# Patient Record
Sex: MALE | Race: WHITE | NOT HISPANIC OR LATINO | URBAN - METROPOLITAN AREA
[De-identification: names, ages, dates, MRNs, and addresses within clinical notes are randomized per-mention and may not be internally consistent; named-entity substitution may affect disease eponyms.]

---

## 2021-03-05 ENCOUNTER — TELEPHONE ENCOUNTER (OUTPATIENT)
Dept: URBAN - METROPOLITAN AREA CLINIC 23 | Facility: CLINIC | Age: 76
End: 2021-03-05

## 2021-03-05 ENCOUNTER — OFFICE VISIT (OUTPATIENT)
Dept: URBAN - NONMETROPOLITAN AREA CLINIC 13 | Facility: CLINIC | Age: 76
End: 2021-03-05
Payer: MEDICARE

## 2021-03-05 ENCOUNTER — LAB OUTSIDE AN ENCOUNTER (OUTPATIENT)
Dept: URBAN - NONMETROPOLITAN AREA CLINIC 13 | Facility: CLINIC | Age: 76
End: 2021-03-05

## 2021-03-05 VITALS
TEMPERATURE: 97.2 F | DIASTOLIC BLOOD PRESSURE: 83 MMHG | BODY MASS INDEX: 22.62 KG/M2 | HEART RATE: 67 BPM | WEIGHT: 167 LBS | HEIGHT: 72 IN | SYSTOLIC BLOOD PRESSURE: 165 MMHG

## 2021-03-05 DIAGNOSIS — Z12.11 SCREENING FOR COLON CANCER: ICD-10-CM

## 2021-03-05 DIAGNOSIS — K21.9 GERD: ICD-10-CM

## 2021-03-05 DIAGNOSIS — K22.2 SCHATZKI'S RING OF DISTAL ESOPHAGUS: ICD-10-CM

## 2021-03-05 PROCEDURE — 99214 OFFICE O/P EST MOD 30 MIN: CPT | Performed by: NURSE PRACTITIONER

## 2021-03-05 RX ORDER — FAMOTIDINE 20 MG/1
1 TAB QD BEFORE SUPPER TABLET ORAL QD
Qty: 90 TABLET | Refills: 3 | OUTPATIENT
Start: 2021-03-05

## 2021-03-05 RX ORDER — OMEPRAZOLE 10 MG/1
1 CAPSULE 30 MINUTES BEFORE MORNING MEAL CAPSULE, DELAYED RELEASE ORAL BID
Qty: 180 CAPSULE | Refills: 3 | OUTPATIENT
Start: 2021-03-05

## 2021-03-05 RX ORDER — ASPIRIN 81 MG/1
CHEW 1 TABLET (81 MG) BY ORAL ROUTE ONCE DAILY TABLET, CHEWABLE ORAL 1
Qty: 0 | Refills: 0 | Status: ACTIVE | COMMUNITY
Start: 1900-01-01

## 2021-03-05 NOTE — HPI-TODAY'S VISIT:
2017 The patient is a 74 year old /White male, who presents on referral from Tayo Arana MD, for a gastroenterology evaluation of GERD. A copy of this document will be sent to the referring provider. The patient is a previous patinet of .  He was being followed for reflux.  His last EGD was in 2016 with an esophageal ring that was dilated.  He has found that his symptoms recur if he goes off of the PPI. Zantac has not worked for him.  He is now taking omeprazole 20mg daily, and he is doing well.  His weight is stable.  he does have osteopenia, but otherwise, he is healthy.  He only rarely has breakthrough symptoms.  His last colonoscopy was normal in 2017.  3/5/2021 Mr. Chaudhry presents for follow-up of reflux and dysphagia.  He has been doing well since his last visit in 2019.  However he recently was diagnosed with osteopenia.  He tried to wean off his omeprazole and was unsuccessful.  He has had increased reflux and dysphagia.  His last EGD was in 2016 with esophagitis and a Schatzki's ring status post dilation by Dr. Buchanan.  He does feel like he needs to be redilated.  He wants to try and wean off PPI, we did have a discussion today regarding dietary changes and cutting his omeprazole to the 10 mg dose which she is interested in.  Today he is doing well otherwise with no further GI complaints.  MB

## 2021-03-15 ENCOUNTER — OFFICE VISIT (OUTPATIENT)
Dept: URBAN - NONMETROPOLITAN AREA SURGERY CENTER 1 | Facility: SURGERY CENTER | Age: 76
End: 2021-03-15
Payer: MEDICARE

## 2021-03-15 ENCOUNTER — CLAIMS CREATED FROM THE CLAIM WINDOW (OUTPATIENT)
Dept: URBAN - METROPOLITAN AREA CLINIC 4 | Facility: CLINIC | Age: 76
End: 2021-03-15
Payer: MEDICARE

## 2021-03-15 DIAGNOSIS — K21.9 ACID REFLUX: ICD-10-CM

## 2021-03-15 DIAGNOSIS — K31.89 DILATION OF STOMACH: ICD-10-CM

## 2021-03-15 DIAGNOSIS — K22.2 ACQUIRED ESOPHAGEAL RING: ICD-10-CM

## 2021-03-15 DIAGNOSIS — K21.00 GASTRO-ESOPHAGEAL REFLUX DISEASE WITH ESOPHAGITIS, WITHOUT BLEEDING: ICD-10-CM

## 2021-03-15 PROCEDURE — 88312 SPECIAL STAINS GROUP 1: CPT | Performed by: PATHOLOGY

## 2021-03-15 PROCEDURE — 88305 TISSUE EXAM BY PATHOLOGIST: CPT | Performed by: PATHOLOGY

## 2021-03-15 PROCEDURE — 43239 EGD BIOPSY SINGLE/MULTIPLE: CPT | Performed by: INTERNAL MEDICINE

## 2021-03-15 PROCEDURE — 43249 ESOPH EGD DILATION <30 MM: CPT | Performed by: INTERNAL MEDICINE

## 2021-03-15 PROCEDURE — G8907 PT DOC NO EVENTS ON DISCHARG: HCPCS | Performed by: INTERNAL MEDICINE

## 2021-04-19 ENCOUNTER — OFFICE VISIT (OUTPATIENT)
Dept: URBAN - NONMETROPOLITAN AREA CLINIC 13 | Facility: CLINIC | Age: 76
End: 2021-04-19
Payer: MEDICARE

## 2021-04-19 VITALS
BODY MASS INDEX: 22.46 KG/M2 | SYSTOLIC BLOOD PRESSURE: 124 MMHG | WEIGHT: 165.8 LBS | DIASTOLIC BLOOD PRESSURE: 68 MMHG | HEIGHT: 72 IN | HEART RATE: 64 BPM

## 2021-04-19 DIAGNOSIS — Z12.11 SCREENING FOR COLON CANCER: ICD-10-CM

## 2021-04-19 DIAGNOSIS — K21.9 GERD: ICD-10-CM

## 2021-04-19 DIAGNOSIS — K44.9 HIATAL HERNIA: ICD-10-CM

## 2021-04-19 DIAGNOSIS — K22.2 SCHATZKI'S RING OF DISTAL ESOPHAGUS: ICD-10-CM

## 2021-04-19 PROCEDURE — 99213 OFFICE O/P EST LOW 20 MIN: CPT | Performed by: NURSE PRACTITIONER

## 2021-04-19 RX ORDER — FAMOTIDINE 20 MG/1
1 TAB QD BEFORE SUPPER TABLET ORAL QD
Qty: 90 TABLET | Refills: 3 | Status: ACTIVE | COMMUNITY
Start: 2021-03-05

## 2021-04-19 RX ORDER — ASPIRIN 81 MG/1
CHEW 1 TABLET (81 MG) BY ORAL ROUTE ONCE DAILY TABLET, CHEWABLE ORAL 1
Qty: 0 | Refills: 0 | Status: ACTIVE | COMMUNITY
Start: 1900-01-01

## 2021-04-19 RX ORDER — OMEPRAZOLE 10 MG/1
1 CAPSULE 30 MINUTES BEFORE MORNING MEAL CAPSULE, DELAYED RELEASE ORAL BID
Qty: 180 CAPSULE | Refills: 3 | Status: ACTIVE | COMMUNITY
Start: 2021-03-05

## 2021-04-19 NOTE — HPI-TODAY'S VISIT:
2017 The patient is a 74 year old /White male, who presents on referral from Tayo Arana MD, for a gastroenterology evaluation of GERD. A copy of this document will be sent to the referring provider. The patient is a previous patinet of .  He was being followed for reflux.  His last EGD was in 2016 with an esophageal ring that was dilated.  He has found that his symptoms recur if he goes off of the PPI. Zantac has not worked for him.  He is now taking omeprazole 20mg daily, and he is doing well.  His weight is stable.  he does have osteopenia, but otherwise, he is healthy.  He only rarely has breakthrough symptoms.  His last colonoscopy was normal in 2017.  3/5/2021 Mr. Chaudhry presents for follow-up of reflux and dysphagia.  He has been doing well since his last visit in 2019.  However he recently was diagnosed with osteopenia.  He tried to wean off his omeprazole and was unsuccessful.  He has had increased reflux and dysphagia.  His last EGD was in 2016 with esophagitis and a Schatzki's ring status post dilation by Dr. Buchanan.  He does feel like he needs to be redilated.  He wants to try and wean off PPI, we did have a discussion today regarding dietary changes and cutting his omeprazole to the 10 mg dose which she is interested in.  Today he is doing well otherwise with no further GI complaints.  MB  4/19/2021 Mr. Chaudhry  presents for follow-up of reflux and hiatal hernia.  Since his last visit he is status post EGD with a 4 cm hiatal hernia and no Ruvalcaba's esophagus.  He has weaned omeprazole 10 mg twice daily and Pepcid at night as needed with good relief.  He did read the acid reflux nutritional guide, and has been making changes like reducing the amount of tomatoes that he is eating and limiting chocolate and limit in his diet.  Today he is doing well otherwise with no new GI complaints.  MB

## 2021-06-17 ENCOUNTER — OFFICE VISIT (OUTPATIENT)
Dept: URBAN - NONMETROPOLITAN AREA CLINIC 2 | Facility: CLINIC | Age: 76
End: 2021-06-17
Payer: MEDICARE

## 2021-06-17 ENCOUNTER — WEB ENCOUNTER (OUTPATIENT)
Dept: URBAN - NONMETROPOLITAN AREA CLINIC 2 | Facility: CLINIC | Age: 76
End: 2021-06-17

## 2021-06-17 ENCOUNTER — LAB OUTSIDE AN ENCOUNTER (OUTPATIENT)
Dept: URBAN - NONMETROPOLITAN AREA CLINIC 2 | Facility: CLINIC | Age: 76
End: 2021-06-17

## 2021-06-17 DIAGNOSIS — K22.2 SCHATZKI'S RING OF DISTAL ESOPHAGUS: ICD-10-CM

## 2021-06-17 DIAGNOSIS — K44.9 HIATAL HERNIA: ICD-10-CM

## 2021-06-17 DIAGNOSIS — K57.90 DIVERTICULOSIS: ICD-10-CM

## 2021-06-17 DIAGNOSIS — Z12.11 SCREENING FOR COLON CANCER: ICD-10-CM

## 2021-06-17 DIAGNOSIS — K21.9 GERD: ICD-10-CM

## 2021-06-17 DIAGNOSIS — K62.5 RECTAL BLEEDING: ICD-10-CM

## 2021-06-17 PROCEDURE — 99214 OFFICE O/P EST MOD 30 MIN: CPT | Performed by: INTERNAL MEDICINE

## 2021-06-17 RX ORDER — FAMOTIDINE 20 MG/1
1 TAB QD BEFORE SUPPER TABLET ORAL QD
Qty: 90 TABLET | Refills: 3 | Status: ACTIVE | COMMUNITY
Start: 2021-03-05

## 2021-06-17 RX ORDER — ASPIRIN 81 MG/1
CHEW 1 TABLET (81 MG) BY ORAL ROUTE ONCE DAILY TABLET, CHEWABLE ORAL 1
Qty: 0 | Refills: 0 | Status: ACTIVE | COMMUNITY
Start: 1900-01-01

## 2021-06-17 RX ORDER — OMEPRAZOLE 10 MG/1
1 CAPSULE 30 MINUTES BEFORE MORNING MEAL CAPSULE, DELAYED RELEASE ORAL BID
Qty: 180 CAPSULE | Refills: 3 | Status: ACTIVE | COMMUNITY
Start: 2021-03-05

## 2021-06-17 RX ORDER — SODIUM, POTASSIUM,MAG SULFATES 17.5-3.13G
354 ML AS DIRECTED SOLUTION, RECONSTITUTED, ORAL ORAL ONCE
Qty: 354 MILLILITER | Refills: 0 | OUTPATIENT
Start: 2021-06-17 | End: 2021-06-18

## 2021-06-17 NOTE — HPI-TODAY'S VISIT:
Mr. Chaudhry presents for evaluation of rectal bleeding.  He states last week he developed shamika bright red blood per rectum.  He had multiple episodes that turned maroon to dark in color.  This was similar to a prior episode in 2017 where he was hospitalized with a likely diverticular bleed.  His colonoscopy was done at the surgery center in [place] by Dr. Lion in 2017 with pandiverticular disease along with internal hemorrhoids.  This morning he had a soft bowel movement with no significant bleeding.  His blood work at Dr. Coronel's office on Tuesday of this week reveals a mildly low hemoglobin at 13.1.  He does feel like this is similar to his previous diverticular bleed but seems to be resolving.  He is not taking any psyllium fiber but does eat Zion seeds every day and his morning smoothie.  Otherwise he is doing well and continues to bike 16 miles a day.  MB

## 2021-08-05 ENCOUNTER — OFFICE VISIT (OUTPATIENT)
Dept: URBAN - NONMETROPOLITAN AREA SURGERY CENTER 1 | Facility: SURGERY CENTER | Age: 76
End: 2021-08-05
Payer: MEDICARE

## 2021-08-05 ENCOUNTER — CLAIMS CREATED FROM THE CLAIM WINDOW (OUTPATIENT)
Dept: URBAN - METROPOLITAN AREA CLINIC 4 | Facility: CLINIC | Age: 76
End: 2021-08-05
Payer: MEDICARE

## 2021-08-05 DIAGNOSIS — D12.5 BENIGN NEOPLASM OF SIGMOID COLON: ICD-10-CM

## 2021-08-05 DIAGNOSIS — K63.89 COLONIC PSEUDOMELANOSIS: ICD-10-CM

## 2021-08-05 DIAGNOSIS — K63.89 BACTERIAL OVERGROWTH SYNDROME: ICD-10-CM

## 2021-08-05 DIAGNOSIS — D12.5 ADENOMA OF SIGMOID COLON: ICD-10-CM

## 2021-08-05 DIAGNOSIS — D12.8 BENIGN NEOPLASM OF RECTUM: ICD-10-CM

## 2021-08-05 DIAGNOSIS — D12.8 ADENOMATOUS POLYP OF RECTUM: ICD-10-CM

## 2021-08-05 DIAGNOSIS — K62.5 ANAL BLEEDING: ICD-10-CM

## 2021-08-05 DIAGNOSIS — D12.0 BENIGN NEOPLASM OF CECUM: ICD-10-CM

## 2021-08-05 DIAGNOSIS — D12.0 ADENOMA OF CECUM: ICD-10-CM

## 2021-08-05 PROCEDURE — 45385 COLONOSCOPY W/LESION REMOVAL: CPT | Performed by: INTERNAL MEDICINE

## 2021-08-05 PROCEDURE — G8907 PT DOC NO EVENTS ON DISCHARG: HCPCS | Performed by: INTERNAL MEDICINE

## 2021-08-05 PROCEDURE — 88305 TISSUE EXAM BY PATHOLOGIST: CPT | Performed by: PATHOLOGY

## 2021-09-09 ENCOUNTER — OFFICE VISIT (OUTPATIENT)
Dept: URBAN - NONMETROPOLITAN AREA CLINIC 2 | Facility: CLINIC | Age: 76
End: 2021-09-09
Payer: MEDICARE

## 2021-09-09 VITALS
WEIGHT: 161 LBS | TEMPERATURE: 97.6 F | DIASTOLIC BLOOD PRESSURE: 75 MMHG | SYSTOLIC BLOOD PRESSURE: 155 MMHG | BODY MASS INDEX: 21.81 KG/M2 | HEART RATE: 62 BPM | HEIGHT: 72 IN

## 2021-09-09 DIAGNOSIS — Z12.11 SCREENING FOR COLON CANCER: ICD-10-CM

## 2021-09-09 DIAGNOSIS — K44.9 HIATAL HERNIA: ICD-10-CM

## 2021-09-09 DIAGNOSIS — Z86.010 PERSONAL HISTORY OF COLONIC POLYPS: ICD-10-CM

## 2021-09-09 DIAGNOSIS — K21.9 GERD: ICD-10-CM

## 2021-09-09 DIAGNOSIS — K22.2 SCHATZKI'S RING OF DISTAL ESOPHAGUS: ICD-10-CM

## 2021-09-09 DIAGNOSIS — K57.90 DIVERTICULOSIS: ICD-10-CM

## 2021-09-09 DIAGNOSIS — K62.5 RECTAL BLEEDING: ICD-10-CM

## 2021-09-09 PROCEDURE — 99214 OFFICE O/P EST MOD 30 MIN: CPT | Performed by: NURSE PRACTITIONER

## 2021-09-09 RX ORDER — ASPIRIN 81 MG/1
CHEW 1 TABLET (81 MG) BY ORAL ROUTE ONCE DAILY TABLET, CHEWABLE ORAL 1
Qty: 0 | Refills: 0 | Status: ACTIVE | COMMUNITY
Start: 1900-01-01

## 2021-09-09 RX ORDER — FAMOTIDINE 20 MG/1
1 TAB QD BEFORE SUPPER TABLET ORAL QD
Qty: 90 TABLET | Refills: 3 | Status: ACTIVE | COMMUNITY
Start: 2021-03-05

## 2021-09-09 RX ORDER — OMEPRAZOLE 10 MG/1
1 CAPSULE 30 MINUTES BEFORE MORNING MEAL CAPSULE, DELAYED RELEASE ORAL BID
Qty: 180 CAPSULE | Refills: 3 | Status: ACTIVE | COMMUNITY
Start: 2021-03-05

## 2021-09-09 NOTE — HPI-TODAY'S VISIT:
Mr. Chaudhry presents for evaluation of rectal bleeding.  He states last week he developed shamika bright red blood per rectum.  He had multiple episodes that turned maroon to dark in color.  This was similar to a prior episode in 2017 where he was hospitalized with a likely diverticular bleed.  His colonoscopy was done at the surgery center in [place] by Dr. Lion in 2017 with pandiverticular disease along with internal hemorrhoids.  This morning he had a soft bowel movement with no significant bleeding.  His blood work at Dr. Coronel's office on Tuesday of this week reveals a mildly low hemoglobin at 13.1.  He does feel like this is similar to his previous diverticular bleed but seems to be resolving.  He is not taking any psyllium fiber but does eat Zion seeds every day and his morning smoothie.  Otherwise he is doing well and continues to bike 16 miles a day.  MB   9/9/2021 Mr. Chaudhry presents for colonoscopy follow-up.  He had 4 polyps, 3 of those were tubular adenomas, one of them is 10 mm.  He has pandiverticular disease with large diverticular pockets.  He has had no further diverticular bleeding.  He is taking his Metamucil daily.  His reflux is stable.  Today he has no new GI complaints.  MB

## 2022-03-30 ENCOUNTER — TELEPHONE ENCOUNTER (OUTPATIENT)
Dept: URBAN - NONMETROPOLITAN AREA CLINIC 13 | Facility: CLINIC | Age: 77
End: 2022-03-30

## 2022-03-30 RX ORDER — FAMOTIDINE 20 MG/1
1 TAB QD BEFORE SUPPER TABLET ORAL QD
Qty: 90 TABLET | Refills: 3
Start: 2021-03-05

## 2022-03-31 ENCOUNTER — TELEPHONE ENCOUNTER (OUTPATIENT)
Dept: URBAN - NONMETROPOLITAN AREA CLINIC 2 | Facility: CLINIC | Age: 77
End: 2022-03-31

## 2022-03-31 RX ORDER — OMEPRAZOLE 10 MG/1
1 CAPSULE 30 MINUTES BEFORE MORNING MEAL CAPSULE, DELAYED RELEASE ORAL BID
Qty: 180 CAPSULE | Refills: 3
Start: 2021-03-05

## 2022-04-22 ENCOUNTER — TELEPHONE ENCOUNTER (OUTPATIENT)
Dept: URBAN - NONMETROPOLITAN AREA CLINIC 2 | Facility: CLINIC | Age: 77
End: 2022-04-22

## 2022-04-22 ENCOUNTER — OFFICE VISIT (OUTPATIENT)
Dept: URBAN - NONMETROPOLITAN AREA CLINIC 2 | Facility: CLINIC | Age: 77
End: 2022-04-22
Payer: MEDICARE

## 2022-04-22 ENCOUNTER — LAB OUTSIDE AN ENCOUNTER (OUTPATIENT)
Dept: URBAN - NONMETROPOLITAN AREA CLINIC 2 | Facility: CLINIC | Age: 77
End: 2022-04-22

## 2022-04-22 VITALS
HEIGHT: 72 IN | WEIGHT: 168 LBS | TEMPERATURE: 97.5 F | SYSTOLIC BLOOD PRESSURE: 136 MMHG | DIASTOLIC BLOOD PRESSURE: 75 MMHG | HEART RATE: 97.5 BPM | BODY MASS INDEX: 22.75 KG/M2

## 2022-04-22 DIAGNOSIS — K57.90 DIVERTICULOSIS: ICD-10-CM

## 2022-04-22 DIAGNOSIS — K62.5 RECTAL BLEEDING: ICD-10-CM

## 2022-04-22 DIAGNOSIS — Z86.010 PERSONAL HISTORY OF COLONIC POLYPS: ICD-10-CM

## 2022-04-22 DIAGNOSIS — K21.9 GERD: ICD-10-CM

## 2022-04-22 DIAGNOSIS — K22.2 SCHATZKI'S RING OF DISTAL ESOPHAGUS: ICD-10-CM

## 2022-04-22 DIAGNOSIS — Z12.11 SCREENING FOR COLON CANCER: ICD-10-CM

## 2022-04-22 DIAGNOSIS — K44.9 HIATAL HERNIA: ICD-10-CM

## 2022-04-22 PROBLEM — 12063002: Status: ACTIVE | Noted: 2021-06-17

## 2022-04-22 PROBLEM — 84089009: Status: ACTIVE | Noted: 2021-04-19

## 2022-04-22 PROBLEM — 397881000: Status: ACTIVE | Noted: 2021-06-17

## 2022-04-22 PROBLEM — 428283002: Status: ACTIVE | Noted: 2021-09-09

## 2022-04-22 PROCEDURE — 99214 OFFICE O/P EST MOD 30 MIN: CPT | Performed by: NURSE PRACTITIONER

## 2022-04-22 RX ORDER — FAMOTIDINE 20 MG/1
1 TAB QD BEFORE SUPPER TABLET ORAL QD
Qty: 90 TABLET | Refills: 3 | Status: ACTIVE | COMMUNITY
Start: 2021-03-05

## 2022-04-22 RX ORDER — ASPIRIN 81 MG/1
CHEW 1 TABLET (81 MG) BY ORAL ROUTE ONCE DAILY TABLET, CHEWABLE ORAL 1
Qty: 0 | Refills: 0 | Status: ACTIVE | COMMUNITY
Start: 1900-01-01

## 2022-04-22 RX ORDER — OMEPRAZOLE 40 MG/1
1 CAPSULE 30 MINUTES BEFORE MORNING MEAL CAPSULE, DELAYED RELEASE ORAL BID
Qty: 180 CAPSULE | Refills: 3 | Status: ACTIVE | COMMUNITY
Start: 2021-03-05

## 2022-04-22 NOTE — HPI-TODAY'S VISIT:
Mr. Chaudhry presents for evaluation of rectal bleeding.  He states last week he developed shamika bright red blood per rectum.  He had multiple episodes that turned maroon to dark in color.  This was similar to a prior episode in 2017 where he was hospitalized with a likely diverticular bleed.  His colonoscopy was done at the surgery center in [place] by Dr. Lion in 2017 with pandiverticular disease along with internal hemorrhoids.  This morning he had a soft bowel movement with no significant bleeding.  His blood work at Dr. Coronel's office on Tuesday of this week reveals a mildly low hemoglobin at 13.1.  He does feel like this is similar to his previous diverticular bleed but seems to be resolving.  He is not taking any psyllium fiber but does eat Zion seeds every day and his morning smoothie.  Otherwise he is doing well and continues to bike 16 miles a day.  MB   9/9/2021 Mr. Chaudhry presents for colonoscopy follow-up.  He had 4 polyps, 3 of those were tubular adenomas, one of them is 10 mm.  He has pandiverticular disease with large diverticular pockets.  He has had no further diverticular bleeding.  He is taking his Metamucil daily.  His reflux is stable.  Today he has no new GI complaints.  MB  4/22/2022 Mr. Chaudhry presents for follow-up of reflux, hiatal hernia, and diverticulosis.  Since his last visit he was doing well on omeprazole 10 mg twice daily until approximately 6 months ago.  He noticed increased reflux and dyspepsia occasionally.  He took Pepcid at night as needed.  Last month he developed significant increased epigastric abdominal pain and some dysphagia.  He saw Dr. Coronel who increased his omeprazole to 40 mg twice daily.  This has improved his symptoms of dyspepsia and reflux.  His dysphagia has improved.  Today we have discussed repeating his upper endoscopy with dilation.  His last EGD was done by Dr. Alcala in March 2021 with a 4 similar hiatal hernia and reflux esophagitis.  He would like to continue the high-dose PPI for 3 months and then wean with his osteopenia.  He agrees to pursue EGD for further evaluation and dilation if symptoms do not resolve on high-dose PPI.  MB

## 2022-07-26 ENCOUNTER — OFFICE VISIT (OUTPATIENT)
Dept: URBAN - NONMETROPOLITAN AREA SURGERY CENTER 1 | Facility: SURGERY CENTER | Age: 77
End: 2022-07-26

## 2022-09-02 ENCOUNTER — OFFICE VISIT (OUTPATIENT)
Dept: URBAN - NONMETROPOLITAN AREA CLINIC 2 | Facility: CLINIC | Age: 77
End: 2022-09-02

## 2023-04-28 ENCOUNTER — ERX REFILL RESPONSE (OUTPATIENT)
Dept: URBAN - NONMETROPOLITAN AREA CLINIC 2 | Facility: CLINIC | Age: 78
End: 2023-04-28

## 2023-04-28 RX ORDER — OMEPRAZOLE 40 MG/1
1 CAPSULE CAPSULE, DELAYED RELEASE ORAL TWICE DAILY
Qty: 180 CAPSULES | Refills: 3 | OUTPATIENT

## 2023-04-28 RX ORDER — OMEPRAZOLE 40 MG/1
1 CAPSULE 30 MINUTES BEFORE MORNING MEAL CAPSULE, DELAYED RELEASE ORAL BID
Qty: 180 CAPSULE | Refills: 3 | OUTPATIENT

## 2023-05-02 ENCOUNTER — TELEPHONE ENCOUNTER (OUTPATIENT)
Dept: URBAN - NONMETROPOLITAN AREA CLINIC 2 | Facility: CLINIC | Age: 78
End: 2023-05-02

## 2023-05-02 ENCOUNTER — ERX REFILL RESPONSE (OUTPATIENT)
Dept: URBAN - NONMETROPOLITAN AREA CLINIC 2 | Facility: CLINIC | Age: 78
End: 2023-05-02

## 2023-05-02 RX ORDER — OMEPRAZOLE 10 MG/1
TAKE 1 CAPSULE BY MOUTH TWICE DAILY 30 MINUTES BEFORE MEAL(S) CAPSULE, DELAYED RELEASE ORAL
Qty: 180 CAPSULE | Refills: 1 | OUTPATIENT

## 2023-05-02 RX ORDER — OMEPRAZOLE 10 MG/1
TAKE 1 CAPSULE BY MOUTH TWICE DAILY 30 MINUTES BEFORE MEAL(S) CAPSULE, DELAYED RELEASE ORAL
Qty: 180 CAPSULE | Refills: 3 | OUTPATIENT

## 2023-05-02 RX ORDER — OMEPRAZOLE 10 MG/1
1 CAPSULE DAILY, EXTRA AS NEEDED CAPSULE, DELAYED RELEASE ORAL TWICE DAILY
Qty: 180 CAPSULES | Refills: 3

## 2024-01-10 ENCOUNTER — TELEPHONE ENCOUNTER (OUTPATIENT)
Dept: URBAN - NONMETROPOLITAN AREA CLINIC 2 | Facility: CLINIC | Age: 79
End: 2024-01-10

## 2024-01-10 RX ORDER — OMEPRAZOLE 10 MG/1
1 CAPSULE DAILY CAPSULE, DELAYED RELEASE ORAL TWICE DAILY
Qty: 180 CAPSULE | Refills: 3

## 2024-05-06 ENCOUNTER — OFFICE VISIT (OUTPATIENT)
Dept: URBAN - NONMETROPOLITAN AREA CLINIC 2 | Facility: CLINIC | Age: 79
End: 2024-05-06
Payer: MEDICARE

## 2024-05-06 ENCOUNTER — DASHBOARD ENCOUNTERS (OUTPATIENT)
Age: 79
End: 2024-05-06

## 2024-05-06 VITALS
SYSTOLIC BLOOD PRESSURE: 128 MMHG | HEIGHT: 72 IN | HEART RATE: 64 BPM | BODY MASS INDEX: 22.35 KG/M2 | WEIGHT: 165 LBS | DIASTOLIC BLOOD PRESSURE: 67 MMHG

## 2024-05-06 DIAGNOSIS — K21.9 GERD: ICD-10-CM

## 2024-05-06 DIAGNOSIS — K62.5 RECTAL BLEEDING: ICD-10-CM

## 2024-05-06 DIAGNOSIS — Z86.010 PERSONAL HISTORY OF COLONIC POLYPS: ICD-10-CM

## 2024-05-06 DIAGNOSIS — K57.30 COLON, DIVERTICULOSIS: ICD-10-CM

## 2024-05-06 PROCEDURE — 99214 OFFICE O/P EST MOD 30 MIN: CPT | Performed by: NURSE PRACTITIONER

## 2024-05-06 RX ORDER — ASPIRIN 81 MG/1
CHEW 1 TABLET (81 MG) BY ORAL ROUTE ONCE DAILY TABLET, CHEWABLE ORAL 1
Qty: 0 | Refills: 0 | Status: ACTIVE | COMMUNITY
Start: 1900-01-01

## 2024-05-06 RX ORDER — OMEPRAZOLE 40 MG/1
1 CAPSULE CAPSULE, DELAYED RELEASE ORAL TWICE DAILY
Qty: 180 CAPSULES | Refills: 3 | Status: ACTIVE | COMMUNITY

## 2024-05-06 RX ORDER — FAMOTIDINE 20 MG/1
1 TAB QD BEFORE SUPPER TABLET ORAL QD
Qty: 90 TABLET | Refills: 3 | Status: ACTIVE | COMMUNITY
Start: 2021-03-05

## 2024-05-06 RX ORDER — SODIUM, POTASSIUM,MAG SULFATES 17.5-3.13G
177 MILLILITER SOLUTION, RECONSTITUTED, ORAL ORAL
Qty: 1 UNSPECIFIED | Refills: 0 | OUTPATIENT
Start: 2024-05-06 | End: 2024-05-07

## 2024-05-06 RX ORDER — OMEPRAZOLE 10 MG/1
1 CAPSULE DAILY CAPSULE, DELAYED RELEASE ORAL TWICE DAILY
Qty: 180 CAPSULE | Refills: 3 | Status: ACTIVE | COMMUNITY

## 2024-05-13 ENCOUNTER — TELEPHONE ENCOUNTER (OUTPATIENT)
Dept: URBAN - NONMETROPOLITAN AREA CLINIC 2 | Facility: CLINIC | Age: 79
End: 2024-05-13

## 2024-05-13 RX ORDER — FAMOTIDINE 20 MG/1
1 TABLET DAILY BEFORE SUPPER TABLET ORAL ONCE A DAY
Qty: 90 TABLET | Refills: 3
Start: 2021-03-05

## 2024-05-13 RX ORDER — OMEPRAZOLE 20 MG/1
1 CAPSULE 30 MINUTES BEFORE MORNING MEAL CAPSULE, DELAYED RELEASE ORAL ONCE A DAY
Qty: 90 CAPSULES | Refills: 3

## 2024-06-25 ENCOUNTER — OUT OF OFFICE VISIT (OUTPATIENT)
Dept: URBAN - NONMETROPOLITAN AREA SURGERY CENTER 1 | Facility: SURGERY CENTER | Age: 79
End: 2024-06-25
Payer: MEDICARE

## 2024-06-25 ENCOUNTER — OFFICE VISIT (OUTPATIENT)
Dept: URBAN - NONMETROPOLITAN AREA SURGERY CENTER 1 | Facility: SURGERY CENTER | Age: 79
End: 2024-06-25

## 2024-06-25 DIAGNOSIS — Z86.010 PERSONAL HISTORY OF COLON POLYPS: ICD-10-CM

## 2024-06-25 DIAGNOSIS — K57.30 DIVERTICULOSIS OF COLON: ICD-10-CM

## 2024-06-25 DIAGNOSIS — Z09 ENCOUNTER FOR COLONOSCOPY FOLLOWING COLON POLYP REMOVAL: ICD-10-CM

## 2024-06-25 PROCEDURE — 00812 ANES LWR INTST SCR COLSC: CPT | Performed by: NURSE ANESTHETIST, CERTIFIED REGISTERED

## 2024-06-25 RX ORDER — OMEPRAZOLE 10 MG/1
1 CAPSULE DAILY CAPSULE, DELAYED RELEASE ORAL TWICE DAILY
Qty: 180 CAPSULE | Refills: 3 | Status: ACTIVE | COMMUNITY

## 2024-06-25 RX ORDER — FAMOTIDINE 20 MG/1
1 TABLET DAILY BEFORE SUPPER TABLET ORAL ONCE A DAY
Qty: 90 TABLET | Refills: 3 | Status: ACTIVE | COMMUNITY
Start: 2021-03-05

## 2024-06-25 RX ORDER — ASPIRIN 81 MG/1
CHEW 1 TABLET (81 MG) BY ORAL ROUTE ONCE DAILY TABLET, CHEWABLE ORAL 1
Qty: 0 | Refills: 0 | Status: ACTIVE | COMMUNITY
Start: 1900-01-01

## 2024-06-25 RX ORDER — OMEPRAZOLE 20 MG/1
1 CAPSULE 30 MINUTES BEFORE MORNING MEAL CAPSULE, DELAYED RELEASE ORAL ONCE A DAY
Qty: 90 CAPSULES | Refills: 3 | Status: ACTIVE | COMMUNITY

## 2024-08-05 ENCOUNTER — LAB OUTSIDE AN ENCOUNTER (OUTPATIENT)
Dept: URBAN - NONMETROPOLITAN AREA CLINIC 2 | Facility: CLINIC | Age: 79
End: 2024-08-05

## 2024-10-14 ENCOUNTER — ERX REFILL RESPONSE (OUTPATIENT)
Dept: URBAN - NONMETROPOLITAN AREA CLINIC 2 | Facility: CLINIC | Age: 79
End: 2024-10-14

## 2024-10-14 RX ORDER — OMEPRAZOLE 10 MG/1
1 CAPSULE DAILY CAPSULE, DELAYED RELEASE ORAL TWICE DAILY
Qty: 180 CAPSULE | Refills: 3 | OUTPATIENT

## 2025-05-06 ENCOUNTER — OFFICE VISIT (OUTPATIENT)
Dept: URBAN - NONMETROPOLITAN AREA CLINIC 2 | Facility: CLINIC | Age: 80
End: 2025-05-06
Payer: MEDICARE

## 2025-05-06 DIAGNOSIS — Z86.0101 PERSONAL HISTORY OF ADENOMATOUS AND SERRATED COLON POLYPS: ICD-10-CM

## 2025-05-06 DIAGNOSIS — K57.90 DIVERTICULOSIS: ICD-10-CM

## 2025-05-06 DIAGNOSIS — K44.9 HIATAL HERNIA: ICD-10-CM

## 2025-05-06 DIAGNOSIS — K22.2 SCHATZKI'S RING OF DISTAL ESOPHAGUS: ICD-10-CM

## 2025-05-06 DIAGNOSIS — K62.5 RECTAL BLEEDING: ICD-10-CM

## 2025-05-06 DIAGNOSIS — K21.9 GERD: ICD-10-CM

## 2025-05-06 DIAGNOSIS — Z12.11 SCREENING FOR COLON CANCER: ICD-10-CM

## 2025-05-06 PROBLEM — 428283002: Status: ACTIVE | Noted: 2025-05-06

## 2025-05-06 PROCEDURE — 99214 OFFICE O/P EST MOD 30 MIN: CPT | Performed by: NURSE PRACTITIONER

## 2025-05-06 RX ORDER — OMEPRAZOLE 10 MG/1
1 CAPSULE DAILY CAPSULE, DELAYED RELEASE ORAL TWICE DAILY
Qty: 180 CAPSULE | Refills: 3 | Status: ACTIVE | COMMUNITY

## 2025-05-06 RX ORDER — ASPIRIN 81 MG/1
CHEW 1 TABLET (81 MG) BY ORAL ROUTE ONCE DAILY TABLET, CHEWABLE ORAL 1
Qty: 0 | Refills: 0 | Status: ACTIVE | COMMUNITY
Start: 1900-01-01

## 2025-05-06 RX ORDER — OMEPRAZOLE 20 MG/1
1 CAPSULE 30 MINUTES BEFORE MORNING MEAL CAPSULE, DELAYED RELEASE ORAL ONCE A DAY
Qty: 90 CAPSULES | Refills: 3 | Status: ACTIVE | COMMUNITY

## 2025-05-06 RX ORDER — APIXABAN 5 MG/1
AS DIRECTED TABLET, FILM COATED ORAL
Status: ACTIVE | COMMUNITY

## 2025-05-06 NOTE — HPI-TODAY'S VISIT:
Mr. Chaudhry presents for evaluation of rectal bleeding.  He states last week he developed shamika bright red blood per rectum.  He had multiple episodes that turned maroon to dark in color.  This was similar to a prior episode in 2017 where he was hospitalized with a likely diverticular bleed.  His colonoscopy was done at the surgery center in [place] by Dr. Lion in 2017 with pandiverticular disease along with internal hemorrhoids.  This morning he had a soft bowel movement with no significant bleeding.  His blood work at Dr. Coronel's office on Tuesday of this week reveals a mildly low hemoglobin at 13.1.  He does feel like this is similar to his previous diverticular bleed but seems to be resolving.  He is not taking any psyllium fiber but does eat Zion seeds every day and his morning smoothie.  Otherwise he is doing well and continues to bike 16 miles a day.  MB   9/9/2021 Mr. Chaudhry presents for colonoscopy follow-up.  He had 4 polyps, 3 of those were tubular adenomas, one of them is 10 mm.  He has pandiverticular disease with large diverticular pockets.  He has had no further diverticular bleeding.  He is taking his Metamucil daily.  His reflux is stable.  Today he has no new GI complaints.  MB  4/22/2022 Mr. Chaudhry presents for follow-up of reflux, hiatal hernia, and diverticulosis.  Since his last visit he was doing well on omeprazole 10 mg twice daily until approximately 6 months ago.  He noticed increased reflux and dyspepsia occasionally.  He took Pepcid at night as needed.  Last month he developed significant increased epigastric abdominal pain and some dysphagia.  He saw Dr. Coronel who increased his omeprazole to 40 mg twice daily.  This has improved his symptoms of dyspepsia and reflux.  His dysphagia has improved.  Today we have discussed repeating his upper endoscopy with dilation.  His last EGD was done by Dr. Alcala in March 2021 with a 4 similar hiatal hernia and reflux esophagitis.  He would like to continue the high-dose PPI for 3 months and then wean with his osteopenia.  He agrees to pursue EGD for further evaluation and dilation if symptoms do not resolve on high-dose PPI.  MB 5/6/2024 Kervin presents for follow-up.  His dysphagia has resolved.  He is on omeprazole 20 mg in the morning and omeprazole 10 mg before supper, we discussed transitioning to famotidine before supper.  He is due for his colonoscopy this summer.  We will schedule that for August.  Today he is doing well otherwise with no new GI complaints.  MB 5/6/2025 Kervin presents for follow-up, since his last visit he has been diagnosed with paroxysmal atrial fibrillation on Eliquis.  He is very concerned about diverticular bleeding again.  He is unable to tolerate the psyllium.  He does eat a very high-fiber diet along with Zion seeds daily.  His bowels are moving perfectly.  He has had no signs or symptoms of GI bleeding on the Eliquis.  Today he denies any new GI complaints.  He is on omeprazole 20 in the morning and 10 mg in the evening and this regimen works best for him.  MB

## 2025-06-18 ENCOUNTER — TELEPHONE ENCOUNTER (OUTPATIENT)
Dept: URBAN - NONMETROPOLITAN AREA CLINIC 2 | Facility: CLINIC | Age: 80
End: 2025-06-18

## 2025-06-18 RX ORDER — OMEPRAZOLE 20 MG/1
1 CAPSULE 30 MINUTES BEFORE MORNING MEAL CAPSULE, DELAYED RELEASE ORAL ONCE A DAY
Qty: 90 CAPSULES | Refills: 3

## 2025-06-18 RX ORDER — OMEPRAZOLE 10 MG/1
1 CAPSULE DAILY CAPSULE, DELAYED RELEASE ORAL TWICE DAILY
Qty: 180 CAPSULE | Refills: 3

## 2025-06-22 ENCOUNTER — ERX REFILL RESPONSE (OUTPATIENT)
Dept: URBAN - NONMETROPOLITAN AREA CLINIC 2 | Facility: CLINIC | Age: 80
End: 2025-06-22

## 2025-06-22 RX ORDER — OMEPRAZOLE 10 MG/1
1 CAPSULE DAILY CAPSULE, DELAYED RELEASE ORAL TWICE DAILY
Qty: 180 CAPSULE | Refills: 3